# Patient Record
Sex: FEMALE | Race: WHITE | NOT HISPANIC OR LATINO | Employment: OTHER | URBAN - METROPOLITAN AREA
[De-identification: names, ages, dates, MRNs, and addresses within clinical notes are randomized per-mention and may not be internally consistent; named-entity substitution may affect disease eponyms.]

---

## 2023-11-08 ENCOUNTER — OFFICE VISIT (OUTPATIENT)
Dept: ORTHOPEDIC SURGERY | Facility: CLINIC | Age: 58
End: 2023-11-08
Payer: COMMERCIAL

## 2023-11-08 ENCOUNTER — CLINICAL SUPPORT (OUTPATIENT)
Dept: ORTHOPEDIC SURGERY | Facility: CLINIC | Age: 58
End: 2023-11-08
Payer: COMMERCIAL

## 2023-11-08 DIAGNOSIS — M89.8X1 PAIN OF LEFT CLAVICLE: ICD-10-CM

## 2023-11-08 DIAGNOSIS — S43.62XA STERNOCLAVICULAR (JOINT) (LIGAMENT) SPRAIN, LEFT, INITIAL ENCOUNTER: Primary | ICD-10-CM

## 2023-11-08 PROCEDURE — 99214 OFFICE O/P EST MOD 30 MIN: CPT | Performed by: ORTHOPAEDIC SURGERY

## 2023-11-08 PROCEDURE — 73000 X-RAY EXAM OF COLLAR BONE: CPT | Mod: LEFT SIDE | Performed by: ORTHOPAEDIC SURGERY

## 2023-11-08 RX ORDER — PREDNISONE 10 MG/1
TABLET ORAL
Qty: 30 TABLET | Refills: 0 | Status: SHIPPED | OUTPATIENT
Start: 2023-11-08

## 2023-11-08 RX ORDER — MELOXICAM 15 MG/1
15 TABLET ORAL
Qty: 30 TABLET | Refills: 0 | Status: SHIPPED | OUTPATIENT
Start: 2023-11-08 | End: 2023-12-05

## 2023-11-08 NOTE — PROGRESS NOTES
History of present illness: Patient with a 3-week history of left sternoclavicular joint pain mild swelling medial clavicle no gross deformity no redness is not hot no sign of infection    She is not sure how she aggravated she is fit she is active she golfs she does fitness activities she tried to let it calm down with rest activity modification    She has not really tried much in the way of anti-inflammatories but still persistent pain of that medial sternoclavicular and    Physical exam:    General: No acute distress or breathing difficulty or discomfort, pleasant and cooperative with the examination.    Extremities: Shoulder joint moves freely in full    No apprehension no apprehension suppression no instability no impingement neurovascular intact motor intact C5-T1    Pinpoint pain over the medial clavicular head    She has pain with crossarm activities and anything that impacted her loads    The right side is uninvolved    There is no gross instability elbow hand and wrist motion is full cervical spine is uninvolved    Diagnostic studies: Clavicular x-ray shows no bony and laterally no obvious calcific growth or excessive buildup medially around the sternoclavicular joint    Impression: Left sternoclavicular joint inflammation probable mild arthritic change    Plan: Anti-inflammatories    Organ to do a steroid Dosepak followed by meloxicam burst for about 14 days    We had standard discussion about irritation with NSAIDs GERD reflex kidney and hypertension issues with long-term use but she knows if she gets any reflux she should stop the medication or take it with food    Gentle range of motion program she is fit and active she can do her own therapy    We emphasized avoiding impact loading to the sternoclavicular head for the next 2 to 3 weeks    Hopefully this will suffice reduce inflammation and let her return to golf and activities in 2 to 3 weeks    She is off to [place]    If her pain returns we would  talk about getting a CT scan and then possibly a CT-guided injection of the sternoclavicular joint at [facility]

## 2023-12-05 DIAGNOSIS — S43.62XA STERNOCLAVICULAR (JOINT) (LIGAMENT) SPRAIN, LEFT, INITIAL ENCOUNTER: ICD-10-CM

## 2023-12-05 RX ORDER — MELOXICAM 15 MG/1
15 TABLET ORAL DAILY
Qty: 30 TABLET | Refills: 0 | Status: SHIPPED | OUTPATIENT
Start: 2023-12-05

## 2023-12-11 ENCOUNTER — TELEPHONE (OUTPATIENT)
Dept: ORTHOPEDICS | Facility: HOSPITAL | Age: 58
End: 2023-12-11
Payer: COMMERCIAL

## 2023-12-11 DIAGNOSIS — S43.62XA STERNOCLAVICULAR (JOINT) (LIGAMENT) SPRAIN, LEFT, INITIAL ENCOUNTER: ICD-10-CM

## 2023-12-11 DIAGNOSIS — M19.019 GLENOHUMERAL ARTHRITIS: Primary | ICD-10-CM

## 2023-12-11 RX ORDER — MELOXICAM 15 MG/1
15 TABLET ORAL DAILY
Qty: 30 TABLET | Refills: 0 | Status: SHIPPED | OUTPATIENT
Start: 2023-12-11 | End: 2024-01-10

## 2023-12-11 NOTE — TELEPHONE ENCOUNTER
Patient communicated over the phone that her prednisone Dosepak controlled inflammation over the left sternoclavicular enlargement and swelling but then it recurred after she went back to light activities including golf    At this time we will arrange for a CAT scan to evaluate the sternoclavicular joint left side and hopefully a fluoroscopic intra-articular injection at that time could be performed.

## 2024-01-24 ENCOUNTER — HOSPITAL ENCOUNTER (OUTPATIENT)
Dept: RADIOLOGY | Facility: HOSPITAL | Age: 59
Discharge: HOME | End: 2024-01-24
Payer: COMMERCIAL

## 2024-01-24 ENCOUNTER — APPOINTMENT (OUTPATIENT)
Dept: RADIOLOGY | Facility: CLINIC | Age: 59
End: 2024-01-24
Payer: COMMERCIAL

## 2024-01-24 DIAGNOSIS — S43.62XA STERNOCLAVICULAR (JOINT) (LIGAMENT) SPRAIN, LEFT, INITIAL ENCOUNTER: ICD-10-CM

## 2024-01-24 PROCEDURE — 2550000001 HC RX 255 CONTRASTS: Performed by: ORTHOPAEDIC SURGERY

## 2024-01-24 PROCEDURE — 2500000005 HC RX 250 GENERAL PHARMACY W/O HCPCS: Performed by: ORTHOPAEDIC SURGERY

## 2024-01-24 PROCEDURE — 2500000004 HC RX 250 GENERAL PHARMACY W/ HCPCS (ALT 636 FOR OP/ED): Performed by: ORTHOPAEDIC SURGERY

## 2024-01-24 PROCEDURE — 71250 CT THORAX DX C-: CPT

## 2024-01-24 PROCEDURE — 20605 DRAIN/INJ JOINT/BURSA W/O US: CPT | Performed by: RADIOLOGY

## 2024-01-24 PROCEDURE — 71250 CT THORAX DX C-: CPT | Performed by: RADIOLOGY

## 2024-01-24 PROCEDURE — 76497 UNLISTED CT PROCEDURE: CPT

## 2024-01-24 PROCEDURE — 77012 CT SCAN FOR NEEDLE BIOPSY: CPT | Performed by: RADIOLOGY

## 2024-01-24 RX ORDER — LIDOCAINE HYDROCHLORIDE 20 MG/ML
INJECTION, SOLUTION EPIDURAL; INFILTRATION; INTRACAUDAL; PERINEURAL AS NEEDED
Status: COMPLETED | OUTPATIENT
Start: 2024-01-24 | End: 2024-01-24

## 2024-01-24 RX ORDER — METHYLPREDNISOLONE ACETATE 40 MG/ML
INJECTION, SUSPENSION INTRA-ARTICULAR; INTRALESIONAL; INTRAMUSCULAR; SOFT TISSUE AS NEEDED
Status: COMPLETED | OUTPATIENT
Start: 2024-01-24 | End: 2024-01-24

## 2024-01-24 RX ADMIN — LIDOCAINE HYDROCHLORIDE 10 ML: 20 INJECTION, SOLUTION EPIDURAL; INFILTRATION; INTRACAUDAL; PERINEURAL at 13:38

## 2024-01-24 RX ADMIN — IOHEXOL 2 ML: 300 INJECTION, SOLUTION INTRAVENOUS at 14:08

## 2024-01-24 RX ADMIN — LIDOCAINE HYDROCHLORIDE 10 ML: 20 INJECTION, SOLUTION EPIDURAL; INFILTRATION; INTRACAUDAL; PERINEURAL at 14:34

## 2024-01-24 RX ADMIN — METHYLPREDNISOLONE ACETATE 48 MG: 40 INJECTION, SUSPENSION INTRA-ARTICULAR; INTRALESIONAL; INTRAMUSCULAR; INTRASYNOVIAL; SOFT TISSUE at 14:32

## 2024-01-24 NOTE — NURSING NOTE
Time out was completed with Lily Marques CT, patient and myself. Patient on CT table for procedure. Site to left sternoclavicular joint cleansed with chlorhexadine, draped. Lidocaine used at site, intermittent CT scans being completed. Joint was injected with 1 mL of fluid, needle was removed. Pressure held at site. Bandaid to site.

## 2024-01-24 NOTE — NURSING NOTE
Patient to CT for Left sternoclavicular joint injection, allergies, meds reviewed. Pt signed consent.

## 2024-10-04 ENCOUNTER — HOSPITAL ENCOUNTER (OUTPATIENT)
Dept: RADIOLOGY | Facility: HOSPITAL | Age: 59
Discharge: HOME | End: 2024-10-04
Payer: COMMERCIAL

## 2024-10-04 VITALS — WEIGHT: 112 LBS | BODY MASS INDEX: 20.61 KG/M2 | HEIGHT: 62 IN

## 2024-10-04 DIAGNOSIS — Z12.31 SCREENING MAMMOGRAM FOR BREAST CANCER: ICD-10-CM

## 2024-10-04 PROCEDURE — 77067 SCR MAMMO BI INCL CAD: CPT

## 2024-10-04 PROCEDURE — 77063 BREAST TOMOSYNTHESIS BI: CPT | Performed by: RADIOLOGY

## 2024-10-04 PROCEDURE — 77067 SCR MAMMO BI INCL CAD: CPT | Performed by: RADIOLOGY

## 2025-07-22 ENCOUNTER — OFFICE VISIT (OUTPATIENT)
Dept: ORTHOPEDIC SURGERY | Facility: CLINIC | Age: 60
End: 2025-07-22
Payer: COMMERCIAL

## 2025-07-22 ENCOUNTER — HOSPITAL ENCOUNTER (OUTPATIENT)
Dept: RADIOLOGY | Facility: HOSPITAL | Age: 60
Discharge: HOME | End: 2025-07-22
Payer: COMMERCIAL

## 2025-07-22 DIAGNOSIS — M77.11 RIGHT TENNIS ELBOW: Primary | ICD-10-CM

## 2025-07-22 DIAGNOSIS — M25.521 RIGHT ELBOW PAIN: ICD-10-CM

## 2025-07-22 PROCEDURE — 73080 X-RAY EXAM OF ELBOW: CPT | Mod: RT

## 2025-07-22 PROCEDURE — 73080 X-RAY EXAM OF ELBOW: CPT | Mod: RIGHT SIDE | Performed by: STUDENT IN AN ORGANIZED HEALTH CARE EDUCATION/TRAINING PROGRAM

## 2025-07-22 PROCEDURE — 99213 OFFICE O/P EST LOW 20 MIN: CPT | Performed by: ORTHOPAEDIC SURGERY

## 2025-07-23 NOTE — PROGRESS NOTES
History of Present Illness  Chief Complaint   Patient presents with    Right Elbow - New Patient Visit     Xrays today       Patient reports ongoing pain and discomfort in the right elbow over the last month.  Patient maintains a high level of activity and participates in golf and tennis almost daily as well as goes to the gym daily and participates in weight training activity.  Patient has had a history with tennis elbow on the right elbow in the past and responded very well to physical therapy.    Medical History[1]    Medication Documentation Review Audit       Reviewed by Debbie Lezama MA (Medical Assistant) on 07/22/25 at 1011      Medication Order Taking? Sig Documenting Provider Last Dose Status   meloxicam (Mobic) 15 mg tablet [id number]  TAKE 1 TABLET BY MOUTH ONCE DAILY WITH A MEAL. Berhane Quarles MD  Active   predniSONE (Deltasone) 10 mg tablet [id number]  50MG FOR 2 DAYS, 40MG FOR 2 DAYS, 30MG FOR 2 DAYS, 20MG FOR 2 DAYS, 10MG FOR 2 DAYS Berhane Quarles MD  Active                    RX Allergies[2]    Social History     Socioeconomic History    Marital status:      Spouse name: Not on file    Number of children: Not on file    Years of education: Not on file    Highest education level: Not on file   Occupational History    Not on file   Tobacco Use    Smoking status: Not on file    Smokeless tobacco: Not on file   Substance and Sexual Activity    Alcohol use: Not on file    Drug use: Not on file    Sexual activity: Not on file   Other Topics Concern    Not on file   Social History Narrative    Not on file     Social Drivers of Health     Financial Resource Strain: Not on file   Food Insecurity: Not on file   Transportation Needs: Not on file   Physical Activity: Not on file   Stress: Not on file   Social Connections: Not on file   Intimate Partner Violence: Not on file   Housing Stability: Not on file       Surgical History[3]         Review of Systems   GENERAL: Negative for malaise,  significant weight loss, fever  MUSCULOSKELETAL: see HPI  NEURO:  Negative      Exam  Right upper extremity: Patient is tender palpation of the lateral condyle.  Reproduces patient's pain.  Neurovascular intact distally.  Pain with resisted extension of the wrist as well as increased pain with gripping.  Full range of motion of the elbow in flexion and extension as well as pronation and supination.     Imaging  AP, oblique and lateral imaging of the right elbow was obtained on the date of this exam to evaluate for cause of right elbow pain    Normal radiographic appearing right elbow       Assessment  Lateral epicondylitis right elbow     Plan  The patient has fairly classic tennis elbow affecting the right elbow.  We discussed treatment modalities to address this.  Strongly recommended utilization of a wrist brace to shut down the common extensor origin and should be worn essentially at all times over the next 4 weeks.  Patient has previously responded very well to physical therapy and as such a prescription was written for physical therapy to address the lateral epicondylitis about the right elbow.  Would recommend follow-up in several months for repeat clinical assessment.            [1]   Past Medical History:  Diagnosis Date    Personal history of malignant neoplasm of ovary     History of ovarian cancer   [2]   Allergies  Allergen Reactions    Adhesive Tape-Silicones Itching and Rash   [3]   Past Surgical History:  Procedure Laterality Date    APPENDECTOMY  2017    Appendectomy    BREAST BIOPSY       SECTION, CLASSIC  2017     Section    OTHER SURGICAL HISTORY  02/10/2020    Tonsillectomy    TUBAL LIGATION  2017    Tubal Ligation

## 2025-07-24 ENCOUNTER — EVALUATION (OUTPATIENT)
Dept: PHYSICAL THERAPY | Facility: CLINIC | Age: 60
End: 2025-07-24
Payer: COMMERCIAL

## 2025-07-24 DIAGNOSIS — M77.11 LATERAL EPICONDYLITIS OF RIGHT ELBOW: Primary | ICD-10-CM

## 2025-07-24 PROCEDURE — 97110 THERAPEUTIC EXERCISES: CPT | Mod: GP

## 2025-07-24 PROCEDURE — 97162 PT EVAL MOD COMPLEX 30 MIN: CPT | Mod: GP

## 2025-07-24 NOTE — PROGRESS NOTES
Patient Name: Glory Laughlin  MRN: [id number]  Today's Date: 7/24/2025  Time Calculation  Start Time: 1405  Stop Time: 1459  Time Calculation (min): 54 min  Current Problem:  1. Lateral epicondylitis of right elbow            Visit 1    Primary Complaint/ Functional Limitation: pain with  golf and tennis   Prior level of function: indep  Date of onset: 6/1/25  Cause: tennis golf weight lifting repetitive use   Pain Location: right elbow lateral  Current Pain: 0/10  Worst Pain: 10/10  Description of pain: sharp ache throbbing burning and fingers are tingling  Aggravating Activities: ice, chipping/ putting   Relieving Activities: heat   Work Requirements/ Hobbies:  active in sports   Prior Treatment and results of Prior treatment: one session PT helped tremendously   Constitutional Symptoms: Patient Denies   Fever Chills Nausea Vomiting Loss of appetite Abdominal pain Change in bowel function Weight loss or gain Headache Unexplained fatigue Malaise Lethargy Weakness Arthralgia  Myalgia  Difficulty in sleeping Breathing trouble  Barriers to treatment/ learning: none     Posture:      Head: forward     Wrist ROM (degrees)    Wrist flexion R/L: 75/85    Wrist Extension R/L: 65/80    Ulnar Deviation R/L: 28/30    Radial Deviation R/L: 22/24    Supination R/L: 55/80    Pronation R/L: 80/90  Shoulder ROM WNL    Strength:   Strength R/L: 20/40  Pinch strength    Special Tests  Spicer +/-    Evaluation, Tests and measures, Education including HEP instruction and feedback. Patient appears to be compliant and motivated to perform HEP as instructed and participate in active physical therapy as indicated. The patient was educated on: the importance of positioning, proper posture, and body mechanics, joint mechanics and pathology, general tissue healing time, the appropriate use of heat and cold to control pain and inflammation, the importance of general therapeutic exercise, especially to stay within pain-free ROM,  specific anatomy, function, & regional interdependence of involved areas, & likely cause of impairments & POC.  Patient's questions were answered to their satisfaction, & patient verbalized understanding & agreement with POC.  The patient presents to Physical Therapy with signs and symptoms consistent with the medical diagnosis of lateral epicondylagia . Key impairments include:  pain decreased strength and difficulty with functional activities such as golf and tennis. Skilled PT is required to address these key impairments and to provide and progress with an appropriate home exercise program. This evaluation is of  mod complexity due to the stable/changing/unstable nature of the patient's presentation as well as the comorbidities and medical factors included in this evaluation.  Treatment may include: Therapeutic Exercise (PROM, AA/AROM, Flexibility, Strengthening, Stabilization, HEP instruction). Therapeutic Activities (Transfers, Body Mechanics, Work Related Activities, Closed Chain, Agility and Power).   Manual Techniques (Soft tissue Mobilization, Joint mobilization/Distraction, Muscle Energy Techniques, Lymphatic Drainage, Dry Needling).  Neuromuscular Reeducation (Postural Training, Balance/Proprioception, Relaxation Techniques). Biofeedback. Aquatic Exercise. Modalities: Ultrasound, Moist Heat, Cryotherapy, Vasopneumatic with or without Cryotherapy. Electrical Stimulation (TENS/IFC/ Pre modulated for pain relief, NMES for Muscle reeducation). Gait Training. Orthotic Fit and Training. Strapping, Kinesio taping.   [facility] tape   Access Code: [email or web address]  URL: [email or web address]  Date: 07/24/2025  Prepared by: Senthil Bates    Exercises  - Seated Thoracic Self Mobilization  - 8 x daily - 7 x weekly - 10 reps  - Cervical Retraction with Overpressure  - 8 x daily - 7 x weekly - 10 reps  - Seated Cervical Retraction and Extension  - 8 x daily - 7 x weekly - 10 reps  - Seated Eccentric  Wrist Extension  - 3 x daily - 7 x weekly - 15 reps  - Standing Wrist Flexion Stretch  - 3 x daily - 7 x weekly - 60 hold  - Supine Elbow Extension with Dumbbell  - 1-2 x daily - 7 x weekly - 20 reps  - Standing Overhead Triceps Stretch  - 1-2 x daily - 7 x weekly - 20 reps  - Forearm Pronation and Supination with Hammer  - 1-2 x daily - 7 x weekly - 20 reps  - Elbow Distraction Self Mobilization  - 1-2 x daily - 7 x weekly - 10 reps - 5 hold    Patient will report resting pain on Visual Analog Scale < or =0  .   Pain at worst will be < or =  0.   Pain with (patients primary complaint) will be < or =  .0    QuickDash score will improve >/= 16 points to demonstrate improved upper extremity functional abilities.    Elbow Range of motion will improve Flexion>/=  150 , Extension >/= 0   to improve joint mechanics with functional activities.    Patient will have improved elbow extension strength >/=  5/5, elbow flexion >/=  5/5 to improve joint mechanics with functional tasks.    Patient will have improved  strength >/=   100 % pinch strength (2 point ,3 point ,key) of non affected extremity to demonstrate improved muscular strength and enable daily activities.    QuickDash score will improve >/= 16 points to demonstrate improved upper extremity functional abilities.    Wrist Range of motion will improve  Flexion>/= 80 , Extension >/= 80, Pronation >/= 80, Supination >/= 80, Ulnar deviation >/30, Radial Deviation >/= 20   to improve joint mechanics with functional activities.    Patient will have improved wrist extension strength >/=  5/5, wrist flexion >/=  5/5, Ulnar & radial deviation 5/5, Supination & pronation 5/5 to improve joint mechanics with functional tasks.    Patient will have improved  strength >/=   100 % pinch strength (2 point ,3 point ,key) of non affected extremity to demonstrate improved muscular strength and enable daily activities.    Patient will correctly perform home exercise program  Independently, demonstrated through patient teach back upon discharge.

## 2025-07-24 NOTE — LETTER
July 24, 2025    Senthil Bates, BEN  [address] [facility]  Rehab Services, [facility] [address]  [facility] [address]    Patient: Glory Laughlin   YOB: 1965   Date of Visit: 7/24/2025       Dear Hunter Dougherty MD  [address] [facility]  [place] [address]    The attached plan of care is being sent to you because your patient’s medical reimbursement requires that you certify the plan of care. Your signature is required to allow uninterrupted insurance coverage.      You may indicate your approval by signing below and faxing this form back to us at Dept Fax: [phone].    Please call Dept: [phone] with any questions or concerns.    Thank you for this referral,        Senthil Bates, BEN  [address] [address]    Payer: Payor: [employer] / Plan: [employer] PLAN / Product Type: *No Product type* /                                                                         Date:     Dear Senthil Bates, PT,     Re: Ms. Glory Laughlin, MRN:[id number]    I certify that I have reviewed the attached plan of care and it is medically necessary for Ms. Glory Laughlin (1965) who is under my care.          ______________________________________                    _________________  Provider name and credentials                                           Date and time                                                                                           Plan of Care 7/24/25   Effective from: 7/24/2025  Effective to: 10/22/2025    Plan ID: [id number]            Participants as of Finalize on 7/24/2025    Name Type Comments Contact Info    Hunter Dougherty MD Referring Provider  [phone]    Senthil Bates PT Physical Therapist  [phone]       Last Plan Note     Author: Senthil Bates PT Status: Incomplete Last edited: 7/24/2025  2:00 PM       Patient Name: Glory Laughlin  MRN: [id number]  Today's  Date: 7/24/2025     Current Problem:  No diagnosis found.    Visit 1    Primary Complaint/ Functional Limitation: ***  Prior level of function: ***  Date of onset: 6/1/25  Cause: tennis golf weight lifting repetitive use   Pain Location: right elbow lateral  Current Pain: 0/10  Worst Pain: 10/10  Description of pain: sharp ache throbbing burning and fingers are tingling  Aggravating Activities: ice, chipping/ putting   Relieving Activities: heat   Work Requirements/ Hobbies:  active in sports   Prior Treatment and results of Prior treatment: one session PT helped tremendously   Constitutional Symptoms: Patient Denies   Fever Chills Nausea Vomiting Loss of appetite Abdominal pain Change in bowel function Weight loss or gain Headache Unexplained fatigue Malaise Lethargy Weakness Arthralgia  Myalgia  Difficulty in sleeping Breathing trouble  Barriers to treatment/ learning: none     Posture:      Head: forward     Wrist ROM (degrees)    Wrist flexion R/L: 75/85    Wrist Extension R/L: 65/80    Ulnar Deviation R/L: 28/30    Radial Deviation R/L: 22/24    Supination R/L: 55/80    Pronation R/L: 80/90  Shoulder ROM WNL    Strength:   Strength R/L: 20/40  Pinch strength    Special Tests  Spicer +/-    Evaluation, Tests and measures, Education including HEP instruction and feedback. Patient appears to be compliant and motivated to perform HEP as instructed and participate in active physical therapy as indicated. The patient was educated on: the importance of positioning, proper posture, and body mechanics, joint mechanics and pathology, general tissue healing time, the appropriate use of heat and cold to control pain and inflammation, the importance of general therapeutic exercise, especially to stay within pain-free ROM, specific anatomy, function, & regional interdependence of involved areas, & likely cause of impairments & POC.  Patient's questions were answered to their satisfaction, & patient verbalized  understanding & agreement with POC.  The patient presents to Physical Therapy with signs and symptoms consistent with the medical diagnosis of lateral epicondylagia . Key impairments include:  pain decreased strength and difficulty with functional activities such as golf and tennis. Skilled PT is required to address these key impairments and to provide and progress with an appropriate home exercise program. This evaluation is of  mod complexity due to the stable/changing/unstable nature of the patient's presentation as well as the comorbidities and medical factors included in this evaluation.  Treatment may include: Therapeutic Exercise (PROM, AA/AROM, Flexibility, Strengthening, Stabilization, HEP instruction). Therapeutic Activities (Transfers, Body Mechanics, Work Related Activities, Closed Chain, Agility and Power).   Manual Techniques (Soft tissue Mobilization, Joint mobilization/Distraction, Muscle Energy Techniques, Lymphatic Drainage, Dry Needling).  Neuromuscular Reeducation (Postural Training, Balance/Proprioception, Relaxation Techniques). Biofeedback. Aquatic Exercise. Modalities: Ultrasound, Moist Heat, Cryotherapy, Vasopneumatic with or without Cryotherapy. Electrical Stimulation (TENS/IFC/ Pre modulated for pain relief, NMES for Muscle reeducation). Gait Training. Orthotic Fit and Training. Strapping, Kinesio taping.     Access Code: [email or web address]  URL: [email or web address]  Date: 07/24/2025  Prepared by: Senthil Bates    Exercises  - Seated Thoracic Self Mobilization  - 8 x daily - 7 x weekly - 10 reps  - Cervical Retraction with Overpressure  - 8 x daily - 7 x weekly - 10 reps  - Seated Cervical Retraction and Extension  - 8 x daily - 7 x weekly - 10 reps  - Seated Eccentric Wrist Extension  - 3 x daily - 7 x weekly - 15 reps  - Standing Wrist Flexion Stretch  - 3 x daily - 7 x weekly - 60 hold  - Supine Elbow Extension with Dumbbell  - 1-2 x daily - 7 x weekly - 20 reps  -  Standing Overhead Triceps Stretch  - 1-2 x daily - 7 x weekly - 20 reps  - Forearm Pronation and Supination with Hammer  - 1-2 x daily - 7 x weekly - 20 reps  - Elbow Distraction Self Mobilization  - 1-2 x daily - 7 x weekly - 10 reps - 5 hold    Patient will report resting pain on Visual Analog Scale < or =0  .   Pain at worst will be < or =  0.   Pain with (patients primary complaint) will be < or =  .0    QuickDash score will improve >/= 16 points to demonstrate improved upper extremity functional abilities.    Elbow Range of motion will improve Flexion>/=  150 , Extension >/= 0   to improve joint mechanics with functional activities.    Patient will have improved elbow extension strength >/=  5/5, elbow flexion >/=  5/5 to improve joint mechanics with functional tasks.    Patient will have improved  strength >/=   100 % pinch strength (2 point ,3 point ,key) of non affected extremity to demonstrate improved muscular strength and enable daily activities.    QuickDash score will improve >/= 16 points to demonstrate improved upper extremity functional abilities.    Wrist Range of motion will improve  Flexion>/= 80 , Extension >/= 80, Pronation >/= 80, Supination >/= 80, Ulnar deviation >/30, Radial Deviation >/= 20   to improve joint mechanics with functional activities.    Patient will have improved wrist extension strength >/=  5/5, wrist flexion >/=  5/5, Ulnar & radial deviation 5/5, Supination & pronation 5/5 to improve joint mechanics with functional tasks.    Patient will have improved  strength >/=   100 % pinch strength (2 point ,3 point ,key) of non affected extremity to demonstrate improved muscular strength and enable daily activities.    Patient will correctly perform home exercise program Independently, demonstrated through patient teach back upon discharge.         Current Participants as of 7/24/2025    Name Type Comments Contact Info    Hunter Dougherty MD Referring Provider  [phone]     Signature pending    Senthil Bates, PT Physical Therapist  [phone]    Signature pending

## 2025-07-29 DIAGNOSIS — M77.11 LATERAL EPICONDYLITIS OF RIGHT ELBOW: Primary | ICD-10-CM

## 2025-07-30 ENCOUNTER — TREATMENT (OUTPATIENT)
Dept: PHYSICAL THERAPY | Facility: CLINIC | Age: 60
End: 2025-07-30
Payer: COMMERCIAL

## 2025-07-30 DIAGNOSIS — M77.11 LATERAL EPICONDYLITIS OF RIGHT ELBOW: Primary | ICD-10-CM

## 2025-07-30 PROCEDURE — 97014 ELECTRIC STIMULATION THERAPY: CPT | Mod: GP

## 2025-07-30 PROCEDURE — 97140 MANUAL THERAPY 1/> REGIONS: CPT | Mod: GP

## 2025-07-30 NOTE — PROGRESS NOTES
Patient Name: Glory Laughlin  MRN: [id number]  Today's Date: 7/30/2025  Time Calculation  Start Time: 1310  Stop Time: 1350  Time Calculation (min): 40 min  Current Problem:  1. Lateral epicondylitis of right elbow  Follow Up In Physical Therapy          Visit 2    Subjective:  Change in pain/ pain level: 0/10  Change in function: played tennis today and pain has increased, yesterday thought that I was healed   Patient comments: golfing right after     Objective: Spicer +    Treatment:    Therapeutic exercise ([id number]):  review HEP  [email or web address]  URL: [email or web address]  Date: 07/24/2025  Prepared by: Senthil Bates     Exercises  - Seated Thoracic Self Mobilization  - 8 x daily - 7 x weekly - 10 reps  - Cervical Retraction with Overpressure  - 8 x daily - 7 x weekly - 10 reps  - Seated Cervical Retraction and Extension  - 8 x daily - 7 x weekly - 10 reps  - Seated Eccentric Wrist Extension  - 3 x daily - 7 x weekly - 15 reps  - Standing Wrist Flexion Stretch  - 3 x daily - 7 x weekly - 60 hold  - Supine Elbow Extension with Dumbbell  - 1-2 x daily - 7 x weekly - 20 reps  - Standing Overhead Triceps Stretch  - 1-2 x daily - 7 x weekly - 20 reps  - Forearm Pronation and Supination with Hammer  - 1-2 x daily - 7 x weekly - 20 reps  - Elbow Distraction Self Mobilization  - 1-2 x daily - 7 x weekly - 10 reps - 5 hold  Manual Therapy ([id number]):  Flexion : ulnar distract, sawmiller cephalic radial mob, outward roll, Ant glide of radius  Extension ulnar distract, golfer  caudal glide, post glide of radius  Pronation/supination: radial distract AP/PA radial glide  IDN x4 with estim   Modalities:  estim with IDN     Assessment:  Patient educated in dry needling precautions, indications, and contraindications. Patient is aware of the risks and benefits of procedure and wishes to have it performed. No adverse reaction to the dry needling noted.   Patient notes improved pain levels resultant from  activities in therapy session. Improved tissue quality noted as well as body mechanics demonstrated after cueing and corrections. Patient continues to require active therapy to progress functional capabilities with decreasing pain levels and improving mechanics with functional activities including progression of Home exercise program. Tolerance to today's treatment was good.   Patient appears motivated and compliant this date, demonstrating a working understanding of principals instructed and home program.    Plan:  Progress with functional strength as tolerated with respect to tissue healing. Manual therapy PRN to improve/maintain ROM, prevent adhesion, reduce pain.

## 2025-07-31 ENCOUNTER — APPOINTMENT (OUTPATIENT)
Dept: PHYSICAL THERAPY | Facility: CLINIC | Age: 60
End: 2025-07-31
Payer: COMMERCIAL

## 2025-08-14 ENCOUNTER — APPOINTMENT (OUTPATIENT)
Dept: PHYSICAL THERAPY | Facility: CLINIC | Age: 60
End: 2025-08-14
Payer: COMMERCIAL

## 2025-08-21 ENCOUNTER — TREATMENT (OUTPATIENT)
Dept: PHYSICAL THERAPY | Facility: CLINIC | Age: 60
End: 2025-08-21
Payer: COMMERCIAL

## 2025-08-21 ENCOUNTER — APPOINTMENT (OUTPATIENT)
Dept: PHYSICAL THERAPY | Facility: CLINIC | Age: 60
End: 2025-08-21
Payer: COMMERCIAL

## 2025-08-21 DIAGNOSIS — M77.11 LATERAL EPICONDYLITIS OF RIGHT ELBOW: Primary | ICD-10-CM

## 2025-08-21 PROCEDURE — 97014 ELECTRIC STIMULATION THERAPY: CPT | Mod: GP

## 2025-08-21 PROCEDURE — 97140 MANUAL THERAPY 1/> REGIONS: CPT | Mod: GP

## 2025-09-23 ENCOUNTER — APPOINTMENT (OUTPATIENT)
Dept: ORTHOPEDIC SURGERY | Facility: CLINIC | Age: 60
End: 2025-09-23
Payer: COMMERCIAL